# Patient Record
Sex: FEMALE | Race: WHITE | Employment: PART TIME | URBAN - METROPOLITAN AREA
[De-identification: names, ages, dates, MRNs, and addresses within clinical notes are randomized per-mention and may not be internally consistent; named-entity substitution may affect disease eponyms.]

---
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
## 2020-10-21 ENCOUNTER — OFFICE VISIT (OUTPATIENT)
Dept: FAMILY MEDICINE CLINIC | Age: 20
End: 2020-10-21
Payer: COMMERCIAL

## 2020-10-21 VITALS
OXYGEN SATURATION: 98 % | WEIGHT: 207 LBS | HEART RATE: 81 BPM | DIASTOLIC BLOOD PRESSURE: 83 MMHG | SYSTOLIC BLOOD PRESSURE: 120 MMHG | HEIGHT: 67 IN | BODY MASS INDEX: 32.49 KG/M2 | TEMPERATURE: 97.8 F

## 2020-10-21 PROCEDURE — G0444 DEPRESSION SCREEN ANNUAL: HCPCS | Performed by: FAMILY MEDICINE

## 2020-10-21 PROCEDURE — 99213 OFFICE O/P EST LOW 20 MIN: CPT | Performed by: FAMILY MEDICINE

## 2020-10-21 RX ORDER — LORATADINE, PSEUDOEPHEDRINE SULFATE 5; 120 MG/1; MG/1
TABLET, FILM COATED, EXTENDED RELEASE ORAL
COMMUNITY
Start: 2020-10-10

## 2020-10-21 RX ORDER — OMEPRAZOLE 40 MG/1
CAPSULE, DELAYED RELEASE ORAL
COMMUNITY
Start: 2020-10-12

## 2020-10-21 RX ORDER — GUAIFENESIN, DEXTROMETHORPHAN HBR 60; 1200 MG/1; MG/1
TABLET ORAL
COMMUNITY
Start: 2020-10-10

## 2020-10-21 RX ORDER — HYDROXYZINE HYDROCHLORIDE 25 MG/1
25 TABLET, FILM COATED ORAL EVERY 8 HOURS PRN
Qty: 30 TABLET | Refills: 0 | Status: SHIPPED | OUTPATIENT
Start: 2020-10-21 | End: 2020-10-31

## 2020-10-21 RX ORDER — DICYCLOMINE HYDROCHLORIDE 10 MG/1
10 CAPSULE ORAL 4 TIMES DAILY
Qty: 30 CAPSULE | Refills: 3 | Status: SHIPPED | OUTPATIENT
Start: 2020-10-21 | End: 2020-11-20

## 2020-10-21 SDOH — HEALTH STABILITY: MENTAL HEALTH: HOW OFTEN DO YOU HAVE A DRINK CONTAINING ALCOHOL?: NEVER

## 2020-10-21 ASSESSMENT — PATIENT HEALTH QUESTIONNAIRE - PHQ9
1. LITTLE INTEREST OR PLEASURE IN DOING THINGS: 2
9. THOUGHTS THAT YOU WOULD BE BETTER OFF DEAD, OR OF HURTING YOURSELF: 0
3. TROUBLE FALLING OR STAYING ASLEEP: 2
SUM OF ALL RESPONSES TO PHQ9 QUESTIONS 1 & 2: 4
SUM OF ALL RESPONSES TO PHQ QUESTIONS 1-9: 11
SUM OF ALL RESPONSES TO PHQ QUESTIONS 1-9: 11
8. MOVING OR SPEAKING SO SLOWLY THAT OTHER PEOPLE COULD HAVE NOTICED. OR THE OPPOSITE, BEING SO FIGETY OR RESTLESS THAT YOU HAVE BEEN MOVING AROUND A LOT MORE THAN USUAL: 0
10. IF YOU CHECKED OFF ANY PROBLEMS, HOW DIFFICULT HAVE THESE PROBLEMS MADE IT FOR YOU TO DO YOUR WORK, TAKE CARE OF THINGS AT HOME, OR GET ALONG WITH OTHER PEOPLE: 2
2. FEELING DOWN, DEPRESSED OR HOPELESS: 2
5. POOR APPETITE OR OVEREATING: 1
SUM OF ALL RESPONSES TO PHQ QUESTIONS 1-9: 11
4. FEELING TIRED OR HAVING LITTLE ENERGY: 3
6. FEELING BAD ABOUT YOURSELF - OR THAT YOU ARE A FAILURE OR HAVE LET YOURSELF OR YOUR FAMILY DOWN: 0
7. TROUBLE CONCENTRATING ON THINGS, SUCH AS READING THE NEWSPAPER OR WATCHING TELEVISION: 1

## 2020-10-21 ASSESSMENT — COLUMBIA-SUICIDE SEVERITY RATING SCALE - C-SSRS
2. HAVE YOU ACTUALLY HAD ANY THOUGHTS OF KILLING YOURSELF?: NO
6. HAVE YOU EVER DONE ANYTHING, STARTED TO DO ANYTHING, OR PREPARED TO DO ANYTHING TO END YOUR LIFE?: NO
1. WITHIN THE PAST MONTH, HAVE YOU WISHED YOU WERE DEAD OR WISHED YOU COULD GO TO SLEEP AND NOT WAKE UP?: NO

## 2021-07-21 ENCOUNTER — TELEPHONE (OUTPATIENT)
Dept: PRIMARY CARE CLINIC | Age: 21
End: 2021-07-21

## 2021-08-01 PROCEDURE — 96374 THER/PROPH/DIAG INJ IV PUSH: CPT

## 2021-08-01 PROCEDURE — 99284 EMERGENCY DEPT VISIT MOD MDM: CPT

## 2021-08-01 PROCEDURE — 96361 HYDRATE IV INFUSION ADD-ON: CPT

## 2021-08-01 PROCEDURE — 96375 TX/PRO/DX INJ NEW DRUG ADDON: CPT

## 2021-08-01 ASSESSMENT — PAIN DESCRIPTION - LOCATION: LOCATION: ABDOMEN

## 2021-08-01 ASSESSMENT — PAIN SCALES - GENERAL: PAINLEVEL_OUTOF10: 5

## 2021-08-02 ENCOUNTER — HOSPITAL ENCOUNTER (EMERGENCY)
Age: 21
Discharge: HOME | End: 2021-08-02
Attending: EMERGENCY MEDICINE
Payer: COMMERCIAL

## 2021-08-02 VITALS
TEMPERATURE: 97.4 F | DIASTOLIC BLOOD PRESSURE: 79 MMHG | OXYGEN SATURATION: 100 % | BODY MASS INDEX: 26.52 KG/M2 | HEIGHT: 68 IN | SYSTOLIC BLOOD PRESSURE: 108 MMHG | HEART RATE: 58 BPM | WEIGHT: 175 LBS | RESPIRATION RATE: 16 BRPM

## 2021-08-02 DIAGNOSIS — R11.0 NAUSEA: Primary | ICD-10-CM

## 2021-08-02 DIAGNOSIS — R10.12 LEFT UPPER QUADRANT ABDOMINAL PAIN: ICD-10-CM

## 2021-08-02 LAB
ABSOLUTE EOS #: 0.14 K/UL (ref 0–0.44)
ABSOLUTE IMMATURE GRANULOCYTE: <0.03 K/UL (ref 0–0.3)
ABSOLUTE LYMPH #: 3.92 K/UL (ref 1.1–3.7)
ABSOLUTE MONO #: 0.64 K/UL (ref 0.1–1.4)
ALBUMIN SERPL-MCNC: 3.8 G/DL (ref 3.5–5.2)
ALBUMIN/GLOBULIN RATIO: 1.3 (ref 1–2.5)
ALP BLD-CCNC: 80 U/L (ref 35–104)
ALT SERPL-CCNC: 39 U/L (ref 5–33)
ANION GAP SERPL CALCULATED.3IONS-SCNC: 11 MMOL/L (ref 9–17)
AST SERPL-CCNC: 40 U/L
BASOPHILS # BLD: 0 % (ref 0–2)
BASOPHILS ABSOLUTE: <0.03 K/UL (ref 0–0.2)
BILIRUB SERPL-MCNC: 0.22 MG/DL (ref 0.3–1.2)
BILIRUBIN URINE: NEGATIVE
BUN BLDV-MCNC: 11 MG/DL (ref 6–20)
BUN/CREAT BLD: ABNORMAL (ref 9–20)
CALCIUM SERPL-MCNC: 8.9 MG/DL (ref 8.6–10.4)
CHLORIDE BLD-SCNC: 105 MMOL/L (ref 98–107)
CO2: 23 MMOL/L (ref 20–31)
COLOR: YELLOW
COMMENT UA: NORMAL
CREAT SERPL-MCNC: 0.74 MG/DL (ref 0.5–0.9)
DIFFERENTIAL TYPE: ABNORMAL
EOSINOPHILS RELATIVE PERCENT: 1 % (ref 1–4)
GFR AFRICAN AMERICAN: >60 ML/MIN
GFR NON-AFRICAN AMERICAN: >60 ML/MIN
GFR SERPL CREATININE-BSD FRML MDRD: ABNORMAL ML/MIN/{1.73_M2}
GFR SERPL CREATININE-BSD FRML MDRD: ABNORMAL ML/MIN/{1.73_M2}
GLUCOSE BLD-MCNC: 94 MG/DL (ref 70–99)
GLUCOSE URINE: NEGATIVE
HCG(URINE) PREGNANCY TEST: NEGATIVE
HCT VFR BLD CALC: 41.5 % (ref 36.3–47.1)
HEMOGLOBIN: 13.4 G/DL (ref 11.9–15.1)
IMMATURE GRANULOCYTES: 0 %
KETONES, URINE: NEGATIVE
LEUKOCYTE ESTERASE, URINE: NEGATIVE
LIPASE: 44 U/L (ref 13–60)
LYMPHOCYTES # BLD: 39 % (ref 25–45)
MCH RBC QN AUTO: 29.5 PG (ref 25.2–33.5)
MCHC RBC AUTO-ENTMCNC: 32.3 G/DL (ref 28.4–34.8)
MCV RBC AUTO: 91.4 FL (ref 82.6–102.9)
MONOCYTES # BLD: 6 % (ref 2–8)
NITRITE, URINE: NEGATIVE
NRBC AUTOMATED: 0 PER 100 WBC
PDW BLD-RTO: 12.4 % (ref 11.8–14.4)
PH UA: 6 (ref 5–8)
PLATELET # BLD: 283 K/UL (ref 138–453)
PLATELET ESTIMATE: ABNORMAL
PMV BLD AUTO: 10.6 FL (ref 8.1–13.5)
POTASSIUM SERPL-SCNC: 3.8 MMOL/L (ref 3.7–5.3)
PROTEIN UA: NEGATIVE
RBC # BLD: 4.54 M/UL (ref 3.95–5.11)
RBC # BLD: ABNORMAL 10*6/UL
SEG NEUTROPHILS: 54 % (ref 34–64)
SEGMENTED NEUTROPHILS ABSOLUTE COUNT: 5.24 K/UL (ref 1.5–8.1)
SODIUM BLD-SCNC: 139 MMOL/L (ref 135–144)
SPECIFIC GRAVITY UA: 1.02 (ref 1–1.03)
TOTAL PROTEIN: 6.8 G/DL (ref 6.4–8.3)
TURBIDITY: CLEAR
URINE HGB: NEGATIVE
UROBILINOGEN, URINE: NORMAL
WBC # BLD: 10 K/UL (ref 4.5–13.5)
WBC # BLD: ABNORMAL 10*3/UL

## 2021-08-02 PROCEDURE — 96375 TX/PRO/DX INJ NEW DRUG ADDON: CPT

## 2021-08-02 PROCEDURE — 96361 HYDRATE IV INFUSION ADD-ON: CPT

## 2021-08-02 PROCEDURE — 2580000003 HC RX 258: Performed by: HEALTH CARE PROVIDER

## 2021-08-02 PROCEDURE — 96374 THER/PROPH/DIAG INJ IV PUSH: CPT

## 2021-08-02 PROCEDURE — 2500000003 HC RX 250 WO HCPCS: Performed by: HEALTH CARE PROVIDER

## 2021-08-02 PROCEDURE — 81003 URINALYSIS AUTO W/O SCOPE: CPT

## 2021-08-02 PROCEDURE — 6360000002 HC RX W HCPCS: Performed by: HEALTH CARE PROVIDER

## 2021-08-02 PROCEDURE — 85025 COMPLETE CBC W/AUTO DIFF WBC: CPT

## 2021-08-02 PROCEDURE — 83690 ASSAY OF LIPASE: CPT

## 2021-08-02 PROCEDURE — 80053 COMPREHEN METABOLIC PANEL: CPT

## 2021-08-02 PROCEDURE — 81025 URINE PREGNANCY TEST: CPT

## 2021-08-02 RX ORDER — ONDANSETRON 2 MG/ML
4 INJECTION INTRAMUSCULAR; INTRAVENOUS ONCE
Status: COMPLETED | OUTPATIENT
Start: 2021-08-02 | End: 2021-08-02

## 2021-08-02 RX ORDER — 0.9 % SODIUM CHLORIDE 0.9 %
1000 INTRAVENOUS SOLUTION INTRAVENOUS ONCE
Status: COMPLETED | OUTPATIENT
Start: 2021-08-02 | End: 2021-08-02

## 2021-08-02 RX ADMIN — SODIUM CHLORIDE 1000 ML: 9 INJECTION, SOLUTION INTRAVENOUS at 02:21

## 2021-08-02 RX ADMIN — ONDANSETRON 4 MG: 2 INJECTION INTRAMUSCULAR; INTRAVENOUS at 02:21

## 2021-08-02 RX ADMIN — FAMOTIDINE 20 MG: 10 INJECTION, SOLUTION INTRAVENOUS at 02:21

## 2021-08-02 ASSESSMENT — ENCOUNTER SYMPTOMS
CONSTIPATION: 0
COLOR CHANGE: 0
ABDOMINAL PAIN: 1
DIARRHEA: 0
SINUS PAIN: 0
SHORTNESS OF BREATH: 0
BLOOD IN STOOL: 0
VOMITING: 0
BACK PAIN: 0
CHEST TIGHTNESS: 0
NAUSEA: 1

## 2021-08-02 NOTE — ED NOTES
[facility]  ED  Emergency Department Encounter  EmergencyMedicine Resident     Pt Name:Aileen Hairston  MRN: [id number]  Birthdate 2000  Date of evaluation: 8/2/21  PCP:  Duncan Gayle MD    This patient was evaluated in the Emergency Department for symptoms described in the history of present illness. The patient was evaluated in the context of the global COVID-19 pandemic, which necessitated consideration that the patient might be at risk for infection with the SARS-CoV-2 virus that causes COVID-19. Institutional protocols and algorithms that pertain to the evaluation of patients at risk for COVID-19 are in a state of rapid change based on information released by regulatory bodies including the CDC and federal and state organizations. These policies and algorithms were followed during the patient's care in the ED. CHIEF COMPLAINT       Chief Complaint   Patient presents with    Abdominal Pain       HISTORY OF PRESENT ILLNESS  (Location/Symptom, Timing/Onset, Context/Setting, Quality, Duration, Modifying Factors, Severity.)      Liana Ochoa is a 23 y.o. female who presents with nausea. Patient states she had about a 2-year history of intermittent nausea and occasional stomach cramping. States she has seen her primary care doctor several times for the issue, but has not seen GI as of yet. Denies any real acute worsening of symptoms, but presents mainly in order to get a second opinion. Patient denies any acute distress. States she currently has some mild nausea however denies any other symptoms. Denies any fever, chills, dysuria, flank pain, hematuria, abdominal pain, blood/mucous in stools. Has been prescribed Prilosec in the past without any relief. Uses Zofran for the intermittent nausea with some success. Denies any recent alcohol, drug, marijuana use.     PAST MEDICAL / SURGICAL / SOCIAL / FAMILY HISTORY      has a past medical history of Drug abuse, daily use ([facility].). has no past surgical history on file. Social History     Socioeconomic History    Marital status: Single     Spouse name: Not on file    Number of children: Not on file    Years of education: Not on file    Highest education level: Not on file   Occupational History    Not on file   Tobacco Use    Smoking status: Never Smoker    Smokeless tobacco: Never Used   Substance and Sexual Activity    Alcohol use: Never    Drug use: Not Currently     Frequency: 3.0 times per week     Types: IV, Cocaine, Other-see comments     Comment: daily herion about 40$ day    Sexual activity: Not on file   Other Topics Concern    Not on file   Social History Narrative    Not on file     Social Determinants of Health     Financial Resource Strain:     Difficulty of Paying Living Expenses:    Food Insecurity:     Worried About Running Out of Food in the Last Year:     [address] in the Last Year:    Transportation Needs:     Lack of Transportation (Medical):  Lack of Transportation (Non-Medical):    Physical Activity:     Days of Exercise per Week:     Minutes of Exercise per Session:    Stress:     Feeling of Stress :    Social Connections:     Frequency of Communication with Friends and Family:     Frequency of Social Gatherings with Friends and Family:     Attends [employer] Services:     Active Member of Clubs or Organizations:     Attends Club or Organization Meetings:     Marital Status:    Intimate Partner Violence:     Fear of Current or Ex-Partner:     Emotionally Abused:     Physically Abused:     Sexually Abused:        No family history on file. Allergies:  Patient has no known allergies. Home Medications:  Prior to Admission medications    Medication Sig Start Date End Date Taking?  Authorizing Provider   Dextromethorphan-guaiFENesin (MUCUS RELIEF DM MAX)  MG TB12  10/10/20   Historical Provider, MD   LORATADINE-D 12HR 5-120 MG per extended release tablet 10/10/20   Historical Provider, MD   omeprazole (PRILOSEC) 40 MG delayed release capsule  10/12/20   Historical Provider, MD   dicyclomine (BENTYL) 10 MG capsule Take 1 capsule by mouth 4 times daily 10/21/20 11/20/20  Bill Couch MD       REVIEW OF SYSTEMS    (2-9 systems for level 4, 10 or more for level 5)      Review of Systems   Constitutional: Negative for chills and fever. HENT: Negative for congestion, ear pain and sinus pain. Respiratory: Negative for chest tightness and shortness of breath. Cardiovascular: Negative for chest pain. Gastrointestinal: Positive for abdominal pain and nausea. Negative for blood in stool, constipation, diarrhea and vomiting. Genitourinary: Negative for dysuria, flank pain and hematuria. Musculoskeletal: Negative for back pain. Skin: Negative for color change. Neurological: Negative for syncope. PHYSICAL EXAM   (up to 7 for level 4, 8 or more for level 5)      INITIAL VITALS:   /79   Pulse 58   Temp 97.4 °F (36.3 °C) (Oral)   Resp 16   Ht 5' 8\" (1.727 m)   Wt 175 lb (79.4 kg)   SpO2 100%   BMI 26.61 kg/m²     Physical Exam  Constitutional:       General: She is not in acute distress. Appearance: She is not ill-appearing or toxic-appearing. HENT:      Head: Normocephalic and atraumatic. Cardiovascular:      Rate and Rhythm: Tachycardia present. Heart sounds: Normal heart sounds. Pulmonary:      Effort: Pulmonary effort is normal. No respiratory distress. Abdominal:      General: Abdomen is flat. There is no distension. There are no signs of injury. Palpations: Abdomen is soft. There is no mass. Tenderness: There is abdominal tenderness in the right upper quadrant and left upper quadrant. Comments: Mild discomfort on deep palpation of right upper quadrant left upper quadrant. Skin:     General: Skin is warm and dry. Neurological:      General: No focal deficit present.       Mental Status: She is alert.   Psychiatric:         Mood and Affect: Mood normal.         Behavior: Behavior normal.         DIFFERENTIAL  DIAGNOSIS     PLAN (LABS / Roque Ingles / EKG):  Orders Placed This Encounter   Procedures    Comprehensive Metabolic Panel w/ Reflex to MG    Urinalysis Reflex to Culture    CBC Auto Differential    Pregnancy, Urine    Lipase    Insert peripheral IV       MEDICATIONS ORDERED:  Orders Placed This Encounter   Medications    0.9 % sodium chloride bolus    ondansetron (ZOFRAN) injection 4 mg    famotidine (PEPCID) injection 20 mg       DDX: Nausea vs gastroenteritis vs dehydration vs less likely hepatitis vs less likely pancreatitis vs less likely colitis    DIAGNOSTIC RESULTS / EMERGENCY DEPARTMENT COURSE / MDM   LAB RESULTS:  Results for orders placed or performed during the hospital encounter of 08/02/21   Comprehensive Metabolic Panel w/ Reflex to MG   Result Value Ref Range    Glucose 94 70 - 99 mg/dL    BUN 11 6 - 20 mg/dL    CREATININE 0.74 0.50 - 0.90 mg/dL    Bun/Cre Ratio NOT REPORTED 9 - 20    Calcium 8.9 8.6 - 10.4 mg/dL    Sodium 139 135 - 144 mmol/L    Potassium 3.8 3.7 - 5.3 mmol/L    Chloride 105 98 - 107 mmol/L    CO2 23 20 - 31 mmol/L    Anion Gap 11 9 - 17 mmol/L    Alkaline Phosphatase 80 35 - 104 U/L    ALT 39 (H) 5 - 33 U/L    AST 40 (H) <32 U/L    Total Bilirubin 0.22 (L) 0.3 - 1.2 mg/dL    Total Protein 6.8 6.4 - 8.3 g/dL    Albumin 3.8 3.5 - 5.2 g/dL    Albumin/Globulin Ratio 1.3 1.0 - 2.5    GFR Non-African American >60 >60 mL/min    GFR African American >60 >60 mL/min    GFR Comment          GFR Staging NOT REPORTED    Urinalysis Reflex to Culture    Specimen: Urine voided   Result Value Ref Range    Color, UA YELLOW YELLOW    Turbidity UA CLEAR CLEAR    Glucose, Ur NEGATIVE NEGATIVE    Bilirubin Urine NEGATIVE NEGATIVE    Ketones, Urine NEGATIVE NEGATIVE    Specific Gravity, UA 1.021 1.005 - 1.030    Urine Hgb NEGATIVE NEGATIVE    pH, UA 6.0 5.0 - 8.0    Protein, UA NEGATIVE NEGATIVE    Urobilinogen, Urine Normal Normal    Nitrite, Urine NEGATIVE NEGATIVE    Leukocyte Esterase, Urine NEGATIVE NEGATIVE    Urinalysis Comments       Microscopic exam not performed based on chemical results unless requested in original order. CBC Auto Differential   Result Value Ref Range    WBC 10.0 4.5 - 13.5 k/uL    RBC 4.54 3.95 - 5.11 m/uL    Hemoglobin 13.4 11.9 - 15.1 g/dL    Hematocrit 41.5 36.3 - 47.1 %    MCV 91.4 82.6 - 102.9 fL    MCH 29.5 25.2 - 33.5 pg    MCHC 32.3 28.4 - 34.8 g/dL    RDW 12.4 11.8 - 14.4 %    Platelets 026 545 - 748 k/uL    MPV 10.6 8.1 - 13.5 fL    NRBC Automated 0.0 0.0 per 100 WBC    Differential Type NOT REPORTED     Seg Neutrophils 54 34 - 64 %    Lymphocytes 39 25 - 45 %    Monocytes 6 2 - 8 %    Eosinophils % 1 1 - 4 %    Basophils 0 0 - 2 %    Immature Granulocytes 0 0 %    Segs Absolute 5.24 1.50 - 8.10 k/uL    Absolute Lymph # 3.92 (H) 1.10 - 3.70 k/uL    Absolute Mono # 0.64 0.10 - 1.40 k/uL    Absolute Eos # 0.14 0.00 - 0.44 k/uL    Basophils Absolute <0.03 0.00 - 0.20 k/uL    Absolute Immature Granulocyte <0.03 0.00 - 0.30 k/uL    WBC Morphology NOT REPORTED     RBC Morphology NOT REPORTED     Platelet Estimate NOT REPORTED    Pregnancy, Urine   Result Value Ref Range    HCG(Urine) Pregnancy Test NEGATIVE NEGATIVE   Lipase   Result Value Ref Range    Lipase 44 13 - 60 U/L       IMPRESSION: 23year old female with chronic nausea and no acute laboratory abnormalities at this time. All EKG's are interpreted by the Emergency Department Physician who either signs or Co-signs this chart in the absence of a cardiologist.    EMERGENCY DEPARTMENT COURSE:  ED Course as of Aug 02 0342   Mon Aug 02, 2021   0215 Will order CBC, CMP, UA, pregnancy. Tachycardia noted. Will order 1L NS while waiting for labs. [JS]   0310 Labs reviewed. Overall unremarkable.  Will get updated set of vitals and plan for d/c.    [JS]      ED Course User Index  [JS] Capo Aguilera          FINAL IMPRESSION      1. Nausea    2.  Left upper quadrant abdominal pain          DISPOSITION / PLAN     DISPOSITION   Discharge      PATIENT REFERRED TO:  Hebert Chambers, [address] [facility] [address]  [phone]    Schedule an appointment as soon as possible for a visit in 3 days      [facility] ED  [address] [address]  [phone]  Go to   If symptoms worsen      DISCHARGE MEDICATIONS:  Discharge Medication List as of 8/2/2021  3:23 AM          Adarsh Leigh DO  Emergency Medicine Resident    (Please note that portions of thisnote were completed with a voice recognition program.  Efforts were made to edit the dictations but occasionally words are mis-transcribed.)     Marnie Henderson DO  Resident  08/02/21 [id number]

## 2021-08-02 NOTE — ED PROVIDER NOTES
Called patient, message left to call office for results.   Lawyer Dang     Emergency Department     Faculty Attestation    I performed a history and physical examination of the patient and discussed management with the resident. I have reviewed and agree with the residents findings including all diagnostic interpretations, and treatment plans as written. Any areas of disagreement are noted on the chart. I was personally present for the key portions of any procedures. I have documented in the chart those procedures where I was not present during the key portions. I have reviewed the emergency nurses triage note. I agree with the chief complaint, past medical history, past surgical history, allergies, medications, social and family history as documented unless otherwise noted below. Documentation of the HPI, Physical Exam and Medical Decision Making performed by [email or web address] is based on my personal performance of the HPI, PE and MDM. For Physician Assistant/ Nurse Practitioner cases/documentation I have personally evaluated this patient and have completed at least one if not all key elements of the E/M (history, physical exam, and MDM). Additional findings are as noted. 24 yo F c/o upper med abdominal pain, nausea without vomiting, no injury, no fever,   pe Edith RN escort for exam: mild upper abdominal tenderness, no distension, no rigidity, no guarding, no rebound, benign exam at this time    Lipase 44, wbc 10, cr 0.7, ua stable, ucg-, I feel pt stable for out pt tx,     EKG Interpretation    Interpreted by me      CRITICAL CARE: There was a high probability of clinically significant/life threatening deterioration in this patient's condition which required my urgent intervention. Total critical care time was 0 minutes. This excludes any time for separately reportable procedures.        Himanshu 23, DO  08/02/21 Alice 38, DO  08/02/21 0939

## 2021-08-05 ENCOUNTER — HOSPITAL ENCOUNTER (EMERGENCY)
Age: 21
Discharge: HOME | End: 2021-08-05
Attending: EMERGENCY MEDICINE
Payer: COMMERCIAL

## 2021-08-05 VITALS
SYSTOLIC BLOOD PRESSURE: 156 MMHG | RESPIRATION RATE: 18 BRPM | DIASTOLIC BLOOD PRESSURE: 96 MMHG | HEIGHT: 68 IN | TEMPERATURE: 98.4 F | OXYGEN SATURATION: 95 % | HEART RATE: 103 BPM | BODY MASS INDEX: 26.52 KG/M2 | WEIGHT: 175 LBS

## 2021-08-05 DIAGNOSIS — R11.2 NON-INTRACTABLE VOMITING WITH NAUSEA, UNSPECIFIED VOMITING TYPE: Primary | ICD-10-CM

## 2021-08-05 LAB
ABSOLUTE EOS #: <0.03 K/UL (ref 0–0.44)
ABSOLUTE IMMATURE GRANULOCYTE: 0.12 K/UL (ref 0–0.3)
ABSOLUTE LYMPH #: 1.39 K/UL (ref 1.1–3.7)
ABSOLUTE MONO #: 0.68 K/UL (ref 0.1–1.4)
ACETAMINOPHEN LEVEL: <5 UG/ML (ref 10–30)
ANION GAP SERPL CALCULATED.3IONS-SCNC: 11 MMOL/L (ref 9–17)
BASOPHILS # BLD: 0 % (ref 0–2)
BASOPHILS ABSOLUTE: <0.03 K/UL (ref 0–0.2)
BUN BLDV-MCNC: 9 MG/DL (ref 6–20)
BUN/CREAT BLD: ABNORMAL (ref 9–20)
CALCIUM SERPL-MCNC: 9.1 MG/DL (ref 8.6–10.4)
CHLORIDE BLD-SCNC: 106 MMOL/L (ref 98–107)
CO2: 21 MMOL/L (ref 20–31)
CREAT SERPL-MCNC: 0.66 MG/DL (ref 0.5–0.9)
DIFFERENTIAL TYPE: ABNORMAL
EOSINOPHILS RELATIVE PERCENT: 0 % (ref 1–4)
ETHANOL PERCENT: <0.01 %
ETHANOL: <10 MG/DL
GFR AFRICAN AMERICAN: >60 ML/MIN
GFR NON-AFRICAN AMERICAN: >60 ML/MIN
GFR SERPL CREATININE-BSD FRML MDRD: ABNORMAL ML/MIN/{1.73_M2}
GFR SERPL CREATININE-BSD FRML MDRD: ABNORMAL ML/MIN/{1.73_M2}
GLUCOSE BLD-MCNC: 167 MG/DL (ref 70–99)
HCT VFR BLD CALC: 44.6 % (ref 36.3–47.1)
HEMOGLOBIN: 14.2 G/DL (ref 11.9–15.1)
IMMATURE GRANULOCYTES: 1 %
LIPASE: 23 U/L (ref 13–60)
LYMPHOCYTES # BLD: 7 % (ref 25–45)
MCH RBC QN AUTO: 29.3 PG (ref 25.2–33.5)
MCHC RBC AUTO-ENTMCNC: 31.8 G/DL (ref 28.4–34.8)
MCV RBC AUTO: 92.1 FL (ref 82.6–102.9)
MONOCYTES # BLD: 4 % (ref 2–8)
NRBC AUTOMATED: 0 PER 100 WBC
PDW BLD-RTO: 12.2 % (ref 11.8–14.4)
PLATELET # BLD: 361 K/UL (ref 138–453)
PLATELET ESTIMATE: ABNORMAL
PMV BLD AUTO: 10.3 FL (ref 8.1–13.5)
POTASSIUM SERPL-SCNC: 4.2 MMOL/L (ref 3.7–5.3)
RBC # BLD: 4.84 M/UL (ref 3.95–5.11)
RBC # BLD: ABNORMAL 10*6/UL
SALICYLATE LEVEL: <1 MG/DL (ref 3–10)
SEG NEUTROPHILS: 88 % (ref 34–64)
SEGMENTED NEUTROPHILS ABSOLUTE COUNT: 16.86 K/UL (ref 1.5–8.1)
SODIUM BLD-SCNC: 138 MMOL/L (ref 135–144)
TOXIC TRICYCLIC SC,BLOOD: NEGATIVE
WBC # BLD: 19.1 K/UL (ref 4.5–13.5)
WBC # BLD: ABNORMAL 10*3/UL

## 2021-08-05 PROCEDURE — 6360000002 HC RX W HCPCS: Performed by: EMERGENCY MEDICINE

## 2021-08-05 PROCEDURE — 99283 EMERGENCY DEPT VISIT LOW MDM: CPT

## 2021-08-05 PROCEDURE — 80307 DRUG TEST PRSMV CHEM ANLYZR: CPT

## 2021-08-05 PROCEDURE — 85025 COMPLETE CBC W/AUTO DIFF WBC: CPT

## 2021-08-05 PROCEDURE — 93005 ELECTROCARDIOGRAM TRACING: CPT | Performed by: GENERAL PRACTICE

## 2021-08-05 PROCEDURE — 96374 THER/PROPH/DIAG INJ IV PUSH: CPT

## 2021-08-05 PROCEDURE — 2580000003 HC RX 258: Performed by: GENERAL PRACTICE

## 2021-08-05 PROCEDURE — 6360000002 HC RX W HCPCS: Performed by: STUDENT IN AN ORGANIZED HEALTH CARE EDUCATION/TRAINING PROGRAM

## 2021-08-05 PROCEDURE — 96376 TX/PRO/DX INJ SAME DRUG ADON: CPT

## 2021-08-05 PROCEDURE — G0480 DRUG TEST DEF 1-7 CLASSES: HCPCS

## 2021-08-05 PROCEDURE — 80143 DRUG ASSAY ACETAMINOPHEN: CPT

## 2021-08-05 PROCEDURE — 81001 URINALYSIS AUTO W/SCOPE: CPT

## 2021-08-05 PROCEDURE — 80048 BASIC METABOLIC PNL TOTAL CA: CPT

## 2021-08-05 PROCEDURE — 83690 ASSAY OF LIPASE: CPT

## 2021-08-05 PROCEDURE — 80179 DRUG ASSAY SALICYLATE: CPT

## 2021-08-05 PROCEDURE — 96361 HYDRATE IV INFUSION ADD-ON: CPT

## 2021-08-05 PROCEDURE — 81025 URINE PREGNANCY TEST: CPT

## 2021-08-05 RX ORDER — ONDANSETRON 2 MG/ML
4 INJECTION INTRAMUSCULAR; INTRAVENOUS ONCE
Status: COMPLETED | OUTPATIENT
Start: 2021-08-05 | End: 2021-08-05

## 2021-08-05 RX ORDER — ONDANSETRON 4 MG/1
4 TABLET, ORALLY DISINTEGRATING ORAL EVERY 8 HOURS PRN
Qty: 20 TABLET | Refills: 0 | Status: SHIPPED | OUTPATIENT
Start: 2021-08-05

## 2021-08-05 RX ORDER — 0.9 % SODIUM CHLORIDE 0.9 %
1000 INTRAVENOUS SOLUTION INTRAVENOUS ONCE
Status: COMPLETED | OUTPATIENT
Start: 2021-08-05 | End: 2021-08-05

## 2021-08-05 RX ADMIN — ONDANSETRON 4 MG: 2 INJECTION, SOLUTION INTRAMUSCULAR; INTRAVENOUS at 21:43

## 2021-08-05 RX ADMIN — ONDANSETRON 4 MG: 2 INJECTION INTRAMUSCULAR; INTRAVENOUS at 17:18

## 2021-08-05 RX ADMIN — SODIUM CHLORIDE 1000 ML: 9 INJECTION, SOLUTION INTRAVENOUS at 17:17

## 2021-08-05 ASSESSMENT — ENCOUNTER SYMPTOMS
VOMITING: 1
ABDOMINAL PAIN: 0
COUGH: 0
SHORTNESS OF BREATH: 0
NAUSEA: 1
DIARRHEA: 0
BACK PAIN: 0

## 2021-08-05 NOTE — ED PROVIDER NOTES
[facility]  FACULTY HANDOFF       Handoff taken on the following patient from prior Attending Physician: Dr. Tami Laughlin  Pt Name: Javan Condon  PCP:  Quinton Ohara MD    Attestation  I was available and discussed any additional care issues that arose and coordinated the management plans with the resident(s) caring for the patient during my duty period. Any areas of disagreement with resident's documentation of care or procedures are noted on the chart. I was personally present for the key portions of any/all procedures during my duty period. I have documented in the chart those procedures where I was not present during the key portions. CHIEF COMPLAINT       Chief Complaint   Patient presents with    Emesis     Pt brought inby medics from work, pt c/o vomiting, seen here two days ago for the same, not getting better, continously falls asleep, medics report the same         CURRENT MEDICATIONS     Previous Medications  Previous Medications    DEXTROMETHORPHAN-GUAIFENESIN (MUCUS RELIEF DM MAX)  MG TB12        DICYCLOMINE (BENTYL) 10 MG CAPSULE    Take 1 capsule by mouth 4 times daily    LORATADINE-D 12HR 5-120 MG PER EXTENDED RELEASE TABLET        OMEPRAZOLE (PRILOSEC) 40 MG DELAYED RELEASE CAPSULE           Encounter Medications  Orders Placed This Encounter   Medications    0.9 % sodium chloride bolus    ondansetron (ZOFRAN) injection 4 mg       ALLERGIES     is allergic to vicodin [hydrocodone-acetaminophen].       RECENT VITALS:   Temp: 98.4 °F (36.9 °C),  Pulse: 103, Resp: 18, BP: (!) 156/96    RADIOLOGY:   No orders to display       LABS:  Labs Reviewed   CBC WITH AUTO DIFFERENTIAL - Abnormal; Notable for the following components:       Result Value    WBC 19.1 (*)     Seg Neutrophils 88 (*)     Lymphocytes 7 (*)     Eosinophils % 0 (*)     Immature Granulocytes 1 (*)     Segs Absolute 16.86 (*)     All other components within normal limits   BASIC METABOLIC PANEL - Abnormal; Notable for the following components:    Glucose 167 (*)     All other components within normal limits   TOX SCR, BLD, ED - Abnormal; Notable for the following components:    Acetaminophen Level <5 (*)     Salicylate Lvl <1 (*)     All other components within normal limits   LIPASE   URINALYSIS WITH MICROSCOPIC   URINE DRUG SCREEN   PREGNANCY, URINE           PLAN/ TASKS OUTSTANDING     Patient has nausea and vomiting. Patient is also somewhat somnolent. Patient had similar symptoms several days ago but has them once again today. Plan for supportive care, labs, if negative work-up and patient is improved plan for discharge with follow-up to PCP.       (Please note that portions of this note were completed with a voice recognition program.  Efforts were made to edit the dictations but occasionally words are mis-transcribed.)    Garth Hays MD, MD,   Attending Emergency Physician       Garth Hays MD  08/05/21 [id number]

## 2021-08-05 NOTE — ED NOTES
Pt ambulated to bathroom, gait steady, unable to obtain urine sample     Pollo Mcleod RN  08/05/21 1944

## 2021-08-05 NOTE — ED PROVIDER NOTES
[facility] [address]  ED  Emergency Department Encounter  EmergencyMedicine Resident     Pt Name:Aileen Sylvester  MRN: [id number]  Birthdate 2000  Date of evaluation: 8/5/21  PCP:  Tima Tellez MD    CHIEF COMPLAINT       Chief Complaint   Patient presents with    Emesis     Pt brought inby medics from work, pt c/o vomiting, seen here two days ago for the same, not getting better, continously falls asleep, medics report the same       HISTORY OF PRESENT ILLNESS  (Location/Symptom, Timing/Onset, Context/Setting, Quality, Duration, Modifying Factors, Severity.)      Sera Padilla is a 23 y.o. female who presents with nausea and vomiting, patient states she has had this intermittent nausea and vomiting for the last 4 years. Patient was seen 2 days ago for similar symptoms. Denies any abdominal pain, diarrhea fever or chills. Denies any dysuria, no hematemesis last menstrual period was 7/8/2021, proxy test was taken 2 days ago which was negative. Denies any alcohol or drug use. EMS was called the patient's work as she was continually falling asleep. Patient states she has vomited 6 times today. Patient is a she is unable to tolerate oral intake. PAST MEDICAL / SURGICAL / SOCIAL / FAMILY HISTORY      has a past medical history of Drug abuse, daily use ([address] has no past surgical history on file.     Social History     Socioeconomic History    Marital status: Single     Spouse name: Not on file    Number of children: Not on file    Years of education: Not on file    Highest education level: Not on file   Occupational History    Not on file   Tobacco Use    Smoking status: Never Smoker    Smokeless tobacco: Never Used   Substance and Sexual Activity    Alcohol use: Never    Drug use: Not Currently     Frequency: 3.0 times per week     Types: IV, Cocaine, Other-see comments     Comment: daily herion about 40$ day    Sexual activity: Not on file   Other Topics Concern    Not on file   Social History Narrative    Not on file     Social Determinants of Health     Financial Resource Strain:     Difficulty of Paying Living Expenses:    Food Insecurity:     Worried About Running Out of Food in the Last Year:     [address] in the Last Year:    Transportation Needs:     Lack of Transportation (Medical):  Lack of Transportation (Non-Medical):    Physical Activity:     Days of Exercise per Week:     Minutes of Exercise per Session:    Stress:     Feeling of Stress :    Social Connections:     Frequency of Communication with Friends and Family:     Frequency of Social Gatherings with Friends and Family:     Attends [employer] Services:     Active Member of Clubs or Organizations:     Attends Club or Organization Meetings:     Marital Status:    Intimate Partner Violence:     Fear of Current or Ex-Partner:     Emotionally Abused:     Physically Abused:     Sexually Abused:        No family history on file. Allergies:  Vicodin [hydrocodone-acetaminophen]    Home Medications:  Prior to Admission medications    Medication Sig Start Date End Date Taking? Authorizing Provider   Dextromethorphan-guaiFENesin (MUCUS RELIEF DM MAX)  MG TB12  10/10/20   Historical Provider, MD   LORATADINE-D 12HR 5-120 MG per extended release tablet  10/10/20   Historical Provider, MD   omeprazole (PRILOSEC) 40 MG delayed release capsule  10/12/20   Historical Provider, MD   dicyclomine (BENTYL) 10 MG capsule Take 1 capsule by mouth 4 times daily 10/21/20 11/20/20  Tam An MD       REVIEW OF SYSTEMS    (2-9 systems for level 4, 10 or more for level 5)      Review of Systems   Constitutional: Positive for activity change and appetite change. Negative for chills and fever. Respiratory: Negative for cough and shortness of breath. Cardiovascular: Negative for chest pain. Gastrointestinal: Positive for nausea and vomiting. Negative for abdominal pain and diarrhea. Genitourinary: Negative for dysuria, menstrual problem, pelvic pain, vaginal bleeding, vaginal discharge and vaginal pain. Musculoskeletal: Negative for back pain. Skin: Negative for rash and wound. Neurological: Negative for dizziness and headaches. Positive for sleepiness   Psychiatric/Behavioral: Positive for behavioral problems. PHYSICAL EXAM   (up to 7 for level 4, 8 or more for level 5)      INITIAL VITALS:   BP (!) 156/96   Pulse 103   Temp 98.4 °F (36.9 °C)   Resp 18   Ht 5' 8\" (1.727 m)   Wt 175 lb (79.4 kg)   LMP 07/08/2021   SpO2 95%   BMI 26.61 kg/m²     Physical Exam  Vitals reviewed. Constitutional:       General: She is not in acute distress. Appearance: She is not toxic-appearing or diaphoretic. Comments: Patient is resting company on stretcher, is alert however will intermittently fall asleep but is easily arousable to voice   HENT:      Head: Normocephalic and atraumatic. Mouth/Throat:      Mouth: Mucous membranes are dry. Pharynx: No oropharyngeal exudate or posterior oropharyngeal erythema. Eyes:      Extraocular Movements: Extraocular movements intact. Conjunctiva/sclera: Conjunctivae normal.      Pupils: Pupils are equal, round, and reactive to light. Cardiovascular:      Rate and Rhythm: Tachycardia present. Pulses: Normal pulses. Heart sounds: Normal heart sounds. Pulmonary:      Effort: Pulmonary effort is normal.      Breath sounds: Normal breath sounds. Abdominal:      General: Abdomen is flat. Palpations: Abdomen is soft. Tenderness: There is no abdominal tenderness. There is no guarding or rebound. Musculoskeletal:      Right lower leg: No edema. Left lower leg: No edema. Skin:     General: Skin is warm.    Neurological:      Comments: Patient follows commands however will initially fall asleep but is easily arousable, upon reevaluation patient was sitting on stool vomiting did not have any further dozing off incidences         DIFFERENTIAL  DIAGNOSIS     PLAN ([place] / IMAGING / EKG):  Orders Placed This Encounter   Procedures    CBC Auto Differential    Basic Metabolic Panel    Lipase    Urinalysis with Microscopic    Urine Drug Screen    PREGNANCY, URINE    TOX SCR, BLD, ED    EKG 12 Lead       MEDICATIONS ORDERED:  Orders Placed This Encounter   Medications    0.9 % sodium chloride bolus    ondansetron (ZOFRAN) injection 4 mg       DDX: Gastritis, cannabinoid induced hyperemesis, electrolyte abnormalities, dehydration, narcolepsy    DIAGNOSTIC RESULTS / EMERGENCY DEPARTMENT COURSE / MDM   :  Results for orders placed or performed during the hospital encounter of 08/05/21   CBC Auto Differential   Result Value Ref Range    WBC 19.1 (H) 4.5 - 13.5 k/uL    RBC 4.84 3.95 - 5.11 m/uL    Hemoglobin 14.2 11.9 - 15.1 g/dL    Hematocrit 44.6 36.3 - 47.1 %    MCV 92.1 82.6 - 102.9 fL    MCH 29.3 25.2 - 33.5 pg    MCHC 31.8 28.4 - 34.8 g/dL    RDW 12.2 11.8 - 14.4 %    Platelets 873 495 - 102 k/uL    MPV 10.3 8.1 - 13.5 fL    NRBC Automated 0.0 0.0 per 100 WBC    Differential Type NOT REPORTED     WBC Morphology NOT REPORTED     RBC Morphology NOT REPORTED     Platelet Estimate NOT REPORTED     Seg Neutrophils 88 (H) 34 - 64 %    Lymphocytes 7 (L) 25 - 45 %    Monocytes 4 2 - 8 %    Eosinophils % 0 (L) 1 - 4 %    Basophils 0 0 - 2 %    Immature Granulocytes 1 (H) 0 %    Segs Absolute 16.86 (H) 1.50 - 8.10 k/uL    Absolute Lymph # 1.39 1.10 - 3.70 k/uL    Absolute Mono # 0.68 0.10 - 1.40 k/uL    Absolute Eos # <0.03 0.00 - 0.44 k/uL    Basophils Absolute <0.03 0.00 - 0.20 k/uL    Absolute Immature Granulocyte 0.12 0.00 - 0.30 k/uL   Basic Metabolic Panel   Result Value Ref Range    Glucose 167 (H) 70 - 99 mg/dL    BUN 9 6 - 20 mg/dL    CREATININE 0.66 0.50 - 0.90 mg/dL    Bun/Cre Ratio NOT REPORTED 9 - 20    Calcium 9.1 8.6 - 10.4 mg/dL    Sodium 138 135 - 144 mmol/L    Potassium 4.2 3.7 - 5.3 mmol/L Chloride 106 98 - 107 mmol/L    CO2 21 20 - 31 mmol/L    Anion Gap 11 9 - 17 mmol/L    GFR Non-African American >60 >60 mL/min    GFR African American >60 >60 mL/min    GFR Comment          GFR Staging NOT REPORTED    Lipase   Result Value Ref Range    Lipase 23 13 - 60 U/L   TOX SCR, BLD, ED   Result Value Ref Range    Acetaminophen Level <5 (L) 10 - 30 ug/mL    Ethanol <10 <10 mg/dL    Ethanol percent <9.379 <4.554 %    Salicylate Lvl <1 (L) 3 - 10 mg/dL    Toxic Tricyclic Sc,Blood NEGATIVE NEGATIVE           RADIOLOGY:  None    EKG  None    All EKG's are interpreted by the Emergency Department Physician who either signs or Co-signs this chart in the absence of a cardiologist.    EMERGENCY DEPARTMENT COURSE/IMPRESSION: 43-year-old female present emergency department for nausea vomiting and falling asleep at work. Patient was seen 2 days ago for similar symptoms. Work-up at that time was unremarkable. Patient states she has this happen intermittently over the last 4 years, has not seen gastrology or any other specialist at this time although she states she does have a referral from her primary care doctor. Patient is slightly tachycardic likely secondary due to vomiting as patient did have retching episode here in the emergency department. Remainder physical exam is unremarkable abdomen is soft, nontender nondistended, no tenderness over McBurney's point or in the right upper quadrant. Patient was dozing off during the middle of the exam and when answering questions, denies any head trauma, there is no evidence of head trauma. We will plan for basic labs, if unremarkable patient will likely need CT head. Although patient denies any illicit drug substance use she does have a history of opioid abuse. We will plan for urine drug screen if negative will likely need further work-up. Patient was given Zofran and fluids.   Signed out to Dr. Nirali Paige awaiting labs and final disposition      PROCEDURES:  None    CONSULTS:  None    CRITICAL CARE:  None    FINAL IMPRESSION      1. Non-intractable vomiting with nausea, unspecified vomiting type          DISPOSITION / PLAN     DISPOSITION        PATIENT REFERRED TO:  No follow-up provider specified.     DISCHARGE MEDICATIONS:  New Prescriptions    No medications on file       Ximena Turcios DO  Emergency Medicine Resident    (Please note that portions of thisnote were completed with a voice recognition program.  Efforts were made to edit the dictations but occasionally words are mis-transcribed.)     Ximena Turcios DO  Resident  08/05/21 [id number]

## 2021-08-05 NOTE — ED PROVIDER NOTES
[facility]     Emergency Department     Faculty Attestation    I performed a history and physical examination of the patient and discussed management with the resident. I reviewed the residents note and agree with the documented findings and plan of care. Any areas of disagreement are noted on the chart. I was personally present for the key portions of any procedures. I have documented in the chart those procedures where I was not present during the key portions. I have reviewed the emergency nurses triage note. I agree with the chief complaint, past medical history, past surgical history, allergies, medications, social and family history as documented unless otherwise noted below. For Physician Assistant/ Nurse Practitioner cases/documentation I have personally evaluated this patient and have completed at least one if not all key elements of the E/M (history, physical exam, and MDM). Additional findings are as noted. I have personally seen and evaluated the patient. I find the patient's history and physical exam are consistent with the NP/PA documentation. I agree with the care provided, treatment rendered, disposition and follow-up plan. 58-year-old female presenting with nausea, vomiting. States it has been going on for several hours, but has had intermittent episodes of vomiting over the last several days. Was seen in the ER on 8/2 for the same. Work-up at that time showed no LFT or electrolyte abnormalities. Patient states Zofran usually helps, but she has not had any at home recently. Having mild diarrhea, no abdominal pain. No fever. Exam:  General: Sitting on the bed, awake, alert and in no acute distress  CV: normal rate and regular rhythm  Lungs: Breathing comfortably on room air with no tachypnea, hypoxia, or increased work of breathing  Abdomen: soft, non-tender, non-distended    Plan:  Labs including CBC, electrolytes, lipase, UA, pregnancy test, tox screen. Will hydrate, give antiemetics. Signed out to oncoming physician pending labs and reassessment.         Patsy Gregorio MD   Attending Emergency  Physician    (Please note that portions of this note were completed with a voice recognition program. Efforts were made to edit the dictations but occasionally words are mis-transcribed.)             Patsy Gregorio MD  08/05/21 0708

## 2021-08-05 NOTE — ED PROVIDER NOTES
Patient denies taking anything other than Prilosec for her symptoms. Patient does have a history of opioid abuse. Abdomen is soft, nontender nondistended. Patient did have episode of vomiting while emergency department was given Zofran and fluids by previous resident. Basic abdominal labs were obtained patient does have leukocytosis which is likely reactive from vomiting. Awaiting urinalysis, urine drug screen, ED tox was negative. On reassessment, patient continues to decline to provide urinalysis, requesting discharge home. Patient is able to eat and drink without difficulty. Will discharge patient, have patient follow-up with PCP for further management. Will prescribe Zofran for symptomatic management. Patient remained stable in the emergency department, was alert and oriented, eating and drinking, able to ambulate without difficulty, requesting discharge without completing work-up, patient understands risks of discharge without full work-up, but continues to request discharge home. OUTSTANDING TASKS / RECOMMENDATIONS:    1. Urinalysis  2. Urine drug screen  3. Disposition     FINAL IMPRESSION:     1. Non-intractable vomiting with nausea, unspecified vomiting type        DISPOSITION:         DISPOSITION:  [x]  Discharge   []  Transfer -    []  Admission -     []  Against Medical Advice   []  Eloped   FOLLOW-UP: No follow-up provider specified.    DISCHARGE MEDICATIONS: New Prescriptions    No medications on file           Annie Manjarrez MD  Emergency Medicine Resident  [id number] [facility]        Annie Manjarrez MD  Resident  08/06/21 [id number]

## 2021-08-06 LAB
EKG ATRIAL RATE: 73 BPM
EKG P AXIS: 79 DEGREES
EKG P-R INTERVAL: 176 MS
EKG Q-T INTERVAL: 388 MS
EKG QRS DURATION: 90 MS
EKG QTC CALCULATION (BAZETT): 427 MS
EKG R AXIS: 80 DEGREES
EKG T AXIS: 59 DEGREES
EKG VENTRICULAR RATE: 73 BPM

## 2021-08-06 PROCEDURE — 93010 ELECTROCARDIOGRAM REPORT: CPT | Performed by: INTERNAL MEDICINE

## 2021-08-29 NOTE — ED PROVIDER NOTES
[facility] ED  Emergency Department Encounter  EmergencyMedicine Resident      Pt Name:Aileen Bah  MRN: [id number]  [place] 2000  Date of evaluation: 8/2/21  PCP:  Vania Bernheim, MD     This patient was evaluated in the Emergency Department for symptoms described in the history of present illness. The patient was evaluated in the context of the global COVID-19 pandemic, which necessitated consideration that the patient might be at risk for infection with the SARS-CoV-2 virus that causes COVID-19. Institutional protocols and algorithms that pertain to the evaluation of patients at risk for COVID-19 are in a state of rapid change based on information released by regulatory bodies including the CDC and federal and state organizations. These policies and algorithms were followed during the patient's care in the ED.     CHIEF COMPLAINT            Chief Complaint   Patient presents with    Abdominal Pain         HISTORY OF PRESENT ILLNESS  (Location/Symptom, Timing/Onset, Context/Setting, Quality, Duration, Modifying Factors, Severity.)       Rigo Patrick is a 23 y.o. female who presents with nausea. Patient states she had about a 2-year history of intermittent nausea and occasional stomach cramping. States she has seen her primary care doctor several times for the issue, but has not seen GI as of yet. Denies any real acute worsening of symptoms, but presents mainly in order to get a second opinion. Patient denies any acute distress. States she currently has some mild nausea however denies any other symptoms. Denies any fever, chills, dysuria, flank pain, hematuria, abdominal pain, blood/mucous in stools. Has been prescribed Prilosec in the past without any relief. Uses Zofran for the intermittent nausea with some success.  Denies any recent alcohol, drug, marijuana use.     PAST MEDICAL / SURGICAL / SOCIAL / FAMILY HISTORY       has a past medical history of Drug abuse, [facility]     has no past surgical history on file.     Social History               Socioeconomic History    Marital status: Single       Spouse name: Not on file    Number of children: Not on file    Years of education: Not on file    Highest education level: Not on file   Occupational History    Not on file   Tobacco Use    Smoking status: Never Smoker    Smokeless tobacco: Never Used   Substance and Sexual Activity    Alcohol use: Never    Drug use: Not Currently       Frequency: 3.0 times per week       Types: IV, Cocaine, Other-see comments       Comment: daily herion about 40$ day    Sexual activity: Not on file   Other Topics Concern    Not on file   Social History Narrative    Not on file      Social Determinants of Health          Financial Resource Strain:     Difficulty of Paying Living Expenses:    Food Insecurity:     Worried About Running Out of Food in the Last Year:     Ran Out of Food in the Last Year:    Transportation Needs:     Lack of Transportation (Medical):  Lack of Transportation (Non-Medical):    Physical Activity:     Days of Exercise per Week:     Minutes of Exercise per Session:    Stress:     Feeling of Stress :    Social Connections:     Frequency of Communication with Friends and Family:     Frequency of Social Gatherings with Friends and Family:     Attends Yarsanism Services:     Active Member of Clubs or Organizations:     Attends Club or Organization Meetings:     Marital Status:    Intimate Partner Violence:     Fear of Current or Ex-Partner:     Emotionally Abused:     Physically Abused:     Sexually Abused:             Family History   No family history on file.        Allergies:  Patient has no known allergies.     Home Medications:  Home Medications           Prior to Admission medications    Medication Sig Start Date End Date Taking?  Authorizing Provider   Dextromethorphan-guaiFENesin (MUCUS RELIEF DM MAX)  MG TB12   10/10/20     Historical Provider, MD   LORATADINE-D 12HR 5-120 MG per extended release tablet   10/10/20     Historical Provider, MD   omeprazole (PRILOSEC) 40 MG delayed release capsule   10/12/20     Historical Provider, MD   dicyclomine (BENTYL) 10 MG capsule Take 1 capsule by mouth 4 times daily 10/21/20 11/20/20   Moses Sauceda MD            REVIEW OF SYSTEMS    (2-9 systems for level 4, 10 or more for level 5)       Review of Systems   Constitutional: Negative for chills and fever. HENT: Negative for congestion, ear pain and sinus pain. Respiratory: Negative for chest tightness and shortness of breath. Cardiovascular: Negative for chest pain. Gastrointestinal: Positive for abdominal pain and nausea. Negative for blood in stool, constipation, diarrhea and vomiting. Genitourinary: Negative for dysuria, flank pain and hematuria. Musculoskeletal: Negative for back pain. Skin: Negative for color change. Neurological: Negative for syncope.         PHYSICAL EXAM   (up to 7 for level 4, 8 or more for level 5)       INITIAL VITALS:   /79   Pulse 58   Temp 97.4 °F (36.3 °C) (Oral)   Resp 16   Ht 5' 8\" (1.727 m)   Wt 175 lb (79.4 kg)   SpO2 100%   BMI 26.61 kg/m²      Physical Exam  Constitutional:       General: She is not in acute distress. Appearance: She is not ill-appearing or toxic-appearing. HENT:      Head: Normocephalic and atraumatic. Cardiovascular:      Rate and Rhythm: Tachycardia present. Heart sounds: Normal heart sounds. Pulmonary:      Effort: Pulmonary effort is normal. No respiratory distress. Abdominal:      General: Abdomen is flat. There is no distension. There are no signs of injury. Palpations: Abdomen is soft. There is no mass. Tenderness: There is abdominal tenderness in the right upper quadrant and left upper quadrant. Comments: Mild discomfort on deep palpation of right upper quadrant left upper quadrant.    Skin:     General: Skin is warm and dry. Neurological:      General: No focal deficit present. Mental Status: She is alert.    Psychiatric:         Mood and Affect: Mood normal.         Behavior: Behavior normal.            DIFFERENTIAL  DIAGNOSIS      PLAN (Dirk Cloud / Clifford Nokomis / EKG):      Orders Placed This Encounter   Procedures    Comprehensive Metabolic Panel w/ Reflex to MG    Urinalysis Reflex to Culture    CBC Auto Differential    Pregnancy, Urine    Lipase    Insert peripheral IV         MEDICATIONS ORDERED:  Encounter Medications        Orders Placed This Encounter   Medications    0.9 % sodium chloride bolus    ondansetron (ZOFRAN) injection 4 mg    famotidine (PEPCID) injection 20 mg            DDX: Nausea vs gastroenteritis vs dehydration vs less likely hepatitis vs less likely pancreatitis vs less likely colitis     DIAGNOSTIC RESULTS / EMERGENCY DEPARTMENT COURSE / MDM   LAB RESULTS:         Results for orders placed or performed during the hospital encounter of 08/02/21   Comprehensive Metabolic Panel w/ Reflex to MG   Result Value Ref Range     Glucose 94 70 - 99 mg/dL     BUN 11 6 - 20 mg/dL     CREATININE 0.74 0.50 - 0.90 mg/dL     Bun/Cre Ratio NOT REPORTED 9 - 20     Calcium 8.9 8.6 - 10.4 mg/dL     Sodium 139 135 - 144 mmol/L     Potassium 3.8 3.7 - 5.3 mmol/L     Chloride 105 98 - 107 mmol/L     CO2 23 20 - 31 mmol/L     Anion Gap 11 9 - 17 mmol/L     Alkaline Phosphatase 80 35 - 104 U/L     ALT 39 (H) 5 - 33 U/L     AST 40 (H) <32 U/L     Total Bilirubin 0.22 (L) 0.3 - 1.2 mg/dL     Total Protein 6.8 6.4 - 8.3 g/dL     Albumin 3.8 3.5 - 5.2 g/dL     Albumin/Globulin Ratio 1.3 1.0 - 2.5     GFR Non-African American >60 >60 mL/min     GFR African American >60 >60 mL/min     GFR Comment            GFR Staging NOT REPORTED     Urinalysis Reflex to Culture     Specimen: Urine voided   Result Value Ref Range     Color, UA YELLOW YELLOW     Turbidity UA CLEAR CLEAR     Glucose, Ur NEGATIVE NEGATIVE     Bilirubin Urine NEGATIVE NEGATIVE     Ketones, Urine NEGATIVE NEGATIVE     Specific Gravity, UA 1.021 1.005 - 1.030     Urine Hgb NEGATIVE NEGATIVE     pH, UA 6.0 5.0 - 8.0     Protein, UA NEGATIVE NEGATIVE     Urobilinogen, Urine Normal Normal     Nitrite, Urine NEGATIVE NEGATIVE     Leukocyte Esterase, Urine NEGATIVE NEGATIVE     Urinalysis Comments           Microscopic exam not performed based on chemical results unless requested in original order.    CBC Auto Differential   Result Value Ref Range     WBC 10.0 4.5 - 13.5 k/uL     RBC 4.54 3.95 - 5.11 m/uL     Hemoglobin 13.4 11.9 - 15.1 g/dL     Hematocrit 41.5 36.3 - 47.1 %     MCV 91.4 82.6 - 102.9 fL     MCH 29.5 25.2 - 33.5 pg     MCHC 32.3 28.4 - 34.8 g/dL     RDW 12.4 11.8 - 14.4 %     Platelets 489 065 - 350 k/uL     MPV 10.6 8.1 - 13.5 fL     NRBC Automated 0.0 0.0 per 100 WBC     Differential Type NOT REPORTED       Seg Neutrophils 54 34 - 64 %     Lymphocytes 39 25 - 45 %     Monocytes 6 2 - 8 %     Eosinophils % 1 1 - 4 %     Basophils 0 0 - 2 %     Immature Granulocytes 0 0 %     Segs Absolute 5.24 1.50 - 8.10 k/uL     Absolute Lymph # 3.92 (H) 1.10 - 3.70 k/uL     Absolute Mono # 0.64 0.10 - 1.40 k/uL     Absolute Eos # 0.14 0.00 - 0.44 k/uL     Basophils Absolute <0.03 0.00 - 0.20 k/uL     Absolute Immature Granulocyte <0.03 0.00 - 0.30 k/uL     WBC Morphology NOT REPORTED       RBC Morphology NOT REPORTED       Platelet Estimate NOT REPORTED     Pregnancy, Urine   Result Value Ref Range     HCG(Urine) Pregnancy Test NEGATIVE NEGATIVE   Lipase   Result Value Ref Range     Lipase 44 13 - 60 U/L         IMPRESSION: 23year old female with chronic nausea and no acute laboratory abnormalities at this time.      All EKG's are interpreted by the Emergency Department Physician who either signs or Co-signs this chart in the absence of a cardiologist.     EMERGENCY DEPARTMENT COURSE:      ED Course as of Aug 02 0342   Mon Aug 02, 2021   0215 Will order CBC, CMP, UA, pregnancy. Tachycardia noted. Will order 1L NS while waiting for labs. [JS]   0310 Labs reviewed. Overall unremarkable. Will get updated set of vitals and plan for d/c.    [JS]       ED Course User Index  [JS] La Nena Grant DO            FINAL IMPRESSION       1. Nausea    2.  Left upper quadrant abdominal pain           DISPOSITION / PLAN      DISPOSITION   Discharge        PATIENT REFERRED TO:  Donato Valenzuela, [address] [facility] [address] [facility]     Schedule an appointment as soon as possible for a visit in 3 days        [facility] ED  [address] [address]  [phone]  Go to   If symptoms worsen        DISCHARGE MEDICATIONS:  Discharge Medication List as of 8/2/2021  3:23 AM             Camilo Taylor DO  Emergency Medicine Resident     (Please note that portions of thisnote were completed with a voice recognition program.  Efforts were made to edit the dictations but occasionally words are mis-transcribed.)     La Nena Grant DO  Resident  08/02/21 [address] Martin DO  Resident  08/29/21 [id number]

## 2021-08-30 ENCOUNTER — TELEPHONE (OUTPATIENT)
Dept: OBGYN | Age: 21
End: 2021-08-30

## 2021-09-07 ENCOUNTER — TELEPHONE (OUTPATIENT)
Dept: OBGYN | Age: 21
End: 2021-09-07

## 2021-09-14 DIAGNOSIS — Z34.90 PREGNANCY, UNSPECIFIED GESTATIONAL AGE: Primary | ICD-10-CM

## 2022-02-04 ENCOUNTER — TELEPHONE (OUTPATIENT)
Dept: PRIMARY CARE CLINIC | Age: 22
End: 2022-02-04

## 2022-02-04 NOTE — TELEPHONE ENCOUNTER
----- Message from Ponce Aaron sent at 2/3/2022  4:13 PM EST -----  Subject: Message to Provider    QUESTIONS  Information for Provider? Amanda Swift with Medicaid is calling get the   diagnosis code for the medication vivitrol. Please call Zane Nino at   [phone]  ---------------------------------------------------------------------------  --------------  Vanessa ORTEGA  What is the best way for the office to contact you? OK to leave message on   voicemail  Preferred Call Back Phone Number? [phone]  ---------------------------------------------------------------------------  --------------  SCRIPT ANSWERS  Relationship to Patient?  Self

## 2022-02-07 NOTE — TELEPHONE ENCOUNTER
Called and left Bethany Patino a message that Dr. Rashida Gonzalez is not treating the pt with that medication

## 2023-04-15 ENCOUNTER — HOSPITAL ENCOUNTER (EMERGENCY)
Age: 23
Discharge: HOME | End: 2023-04-15
Attending: EMERGENCY MEDICINE
Payer: COMMERCIAL

## 2023-04-15 VITALS
SYSTOLIC BLOOD PRESSURE: 122 MMHG | OXYGEN SATURATION: 97 % | RESPIRATION RATE: 14 BRPM | DIASTOLIC BLOOD PRESSURE: 77 MMHG | HEART RATE: 98 BPM | TEMPERATURE: 98.6 F

## 2023-04-15 DIAGNOSIS — L02.419 AXILLARY ABSCESS: Primary | ICD-10-CM

## 2023-04-15 PROCEDURE — 90715 TDAP VACCINE 7 YRS/> IM: CPT | Performed by: STUDENT IN AN ORGANIZED HEALTH CARE EDUCATION/TRAINING PROGRAM

## 2023-04-15 PROCEDURE — 6360000002 HC RX W HCPCS: Performed by: STUDENT IN AN ORGANIZED HEALTH CARE EDUCATION/TRAINING PROGRAM

## 2023-04-15 PROCEDURE — 99284 EMERGENCY DEPT VISIT MOD MDM: CPT

## 2023-04-15 PROCEDURE — 90471 IMMUNIZATION ADMIN: CPT | Performed by: STUDENT IN AN ORGANIZED HEALTH CARE EDUCATION/TRAINING PROGRAM

## 2023-04-15 RX ADMIN — TETANUS TOXOID, REDUCED DIPHTHERIA TOXOID AND ACELLULAR PERTUSSIS VACCINE, ADSORBED 0.5 ML: 5; 2.5; 8; 8; 2.5 SUSPENSION INTRAMUSCULAR at 21:12

## 2023-04-15 ASSESSMENT — ENCOUNTER SYMPTOMS
NAUSEA: 0
ABDOMINAL PAIN: 0
VOMITING: 0
COUGH: 0
SHORTNESS OF BREATH: 0

## 2023-05-06 ENCOUNTER — APPOINTMENT (OUTPATIENT)
Dept: ULTRASOUND IMAGING | Age: 23
End: 2023-05-06
Payer: COMMERCIAL

## 2023-05-06 ENCOUNTER — HOSPITAL ENCOUNTER (EMERGENCY)
Age: 23
Discharge: HOME | End: 2023-05-06
Attending: EMERGENCY MEDICINE
Payer: COMMERCIAL

## 2023-05-06 VITALS
BODY MASS INDEX: 30.31 KG/M2 | HEIGHT: 68 IN | SYSTOLIC BLOOD PRESSURE: 129 MMHG | OXYGEN SATURATION: 100 % | TEMPERATURE: 98.1 F | RESPIRATION RATE: 19 BRPM | HEART RATE: 89 BPM | DIASTOLIC BLOOD PRESSURE: 87 MMHG | WEIGHT: 200 LBS

## 2023-05-06 DIAGNOSIS — N93.9 VAGINAL BLEEDING: Primary | ICD-10-CM

## 2023-05-06 LAB
AMORPHOUS: ABNORMAL
BILIRUBIN URINE: NEGATIVE
CANDIDA SPECIES, DNA PROBE: NEGATIVE
COLOR: YELLOW
EPITHELIAL CELLS UA: ABNORMAL /HPF (ref 0–5)
GARDNERELLA VAGINALIS, DNA PROBE: NEGATIVE
GLUCOSE UR STRIP.AUTO-MCNC: NEGATIVE MG/DL
HCG QUANTITATIVE: <1 MIU/ML
KETONES UR STRIP.AUTO-MCNC: NEGATIVE MG/DL
LEUKOCYTE ESTERASE UR QL STRIP.AUTO: NEGATIVE
NITRITE UR QL STRIP.AUTO: NEGATIVE
PROT UR STRIP.AUTO-MCNC: 7.5 MG/DL (ref 5–8)
PROT UR STRIP.AUTO-MCNC: NEGATIVE MG/DL
RBC CLUMPS #/AREA URNS AUTO: ABNORMAL /HPF (ref 0–2)
SOURCE: NORMAL
SPECIFIC GRAVITY UA: 1.02 (ref 1–1.03)
TRICHOMONAS VAGINALIS DNA: NEGATIVE
TURBIDITY: ABNORMAL
URINE HGB: ABNORMAL
UROBILINOGEN, URINE: NORMAL
WBC UA: ABNORMAL /HPF (ref 0–5)

## 2023-05-06 PROCEDURE — 87480 CANDIDA DNA DIR PROBE: CPT

## 2023-05-06 PROCEDURE — 84702 CHORIONIC GONADOTROPIN TEST: CPT

## 2023-05-06 PROCEDURE — 87510 GARDNER VAG DNA DIR PROBE: CPT

## 2023-05-06 PROCEDURE — 76830 TRANSVAGINAL US NON-OB: CPT

## 2023-05-06 PROCEDURE — 87660 TRICHOMONAS VAGIN DIR PROBE: CPT

## 2023-05-06 PROCEDURE — 99284 EMERGENCY DEPT VISIT MOD MDM: CPT

## 2023-05-06 PROCEDURE — 87491 CHLMYD TRACH DNA AMP PROBE: CPT

## 2023-05-06 PROCEDURE — 81001 URINALYSIS AUTO W/SCOPE: CPT

## 2023-05-06 PROCEDURE — 87591 N.GONORRHOEAE DNA AMP PROB: CPT

## 2023-05-06 ASSESSMENT — ENCOUNTER SYMPTOMS
SHORTNESS OF BREATH: 0
COUGH: 0
CHOKING: 0
CHEST TIGHTNESS: 0
SORE THROAT: 0
ABDOMINAL PAIN: 0

## 2023-05-06 ASSESSMENT — PAIN - FUNCTIONAL ASSESSMENT: PAIN_FUNCTIONAL_ASSESSMENT: NONE - DENIES PAIN

## 2023-05-06 NOTE — ED TRIAGE NOTES
Pt ambulated to room 01 from triage with c/o of having vaginal bleeding from an  last month. Patient has been bleeding since then. Patient does state she has been passing clots. Pt states the bleeding is light but ongoing. Pt respirations are even and unlabored, pt is alert and oriented X 4, speaking in complete sentences, bed is in the lowest position, call light is within reach.

## 2023-05-06 NOTE — ED NOTES
Pt back from US  Pt respirations are even and unlabored, pt is alert and oriented X 4, speaking in complete sentences, bed is in the lowest position, call light is within reach.          Odalys Ordoñez RN  05/06/23 2534

## 2023-05-06 NOTE — DISCHARGE INSTRUCTIONS
Call today or tomorrow to follow up with Bryanna Carrasquillo MD  or your OB / GYN in 2-3 days for follow up of your bleeding. Use ibuprofen or Tylenol for pain. You can take over the counter Ibuprofen (advil) tablets (4 tablets every 8 hours or 3 tablets every 6 hours or 2 tablets every 4 hours). Please continue to take your oral contraceptives as directed. Return to the Emergency Department for worsening of vaginal bleeding, using more than 4 pads per hour, feeling of weakness, dizzy, nausea / vomiting, any other care or concern.

## 2023-05-06 NOTE — ED PROVIDER NOTES
[address]  ED  eMERGENCY dEPARTMENT eNCOUnter   Attending Attestation     Pt Name: Laine Claire  MRN: [id number]  [place] 2000  Date of evaluation: 23       Laine Claire is a 20 y.o. female who presents with Vaginal Bleeding      History: Presents with vaginal bleeding. Patient had a elective  at 19 weeks approximately 2 months ago. Patient was started on birth control after that. Patient missed 1 day and started bleeding after that is been bleeding since. She says she has had some intermittent cramping but no pain right now. Exam: Heart rate and rhythm are regular. Lungs are clear to auscultation bilaterally. Abdomen is soft, nontender. Patient awake alert and acting appropriately. Please see resident note for pelvic exam.    Plan for pelvic exam, labs, urinalysis, ultrasound primarily to rule out retained products of conception    I performed a history and physical examination of the patient and discussed management with the resident. I reviewed the residents note and agree with the documented findings and plan of care. Any areas of disagreement are noted on the chart. I was personally present for the key portions of any procedures. I have documented in the chart those procedures where I was not present during the key portions. I have personally reviewed all images and agree with the resident's interpretation. I have reviewed the emergency nurses triage note. I agree with the chief complaint, past medical history, past surgical history, allergies, medications, social and family history as documented unless otherwise noted below. Documentation of the HPI, Physical Exam and Medical Decision Making performed by medical students or scribes is based on my personal performance of the HPI, PE and MDM.  For Phys Assistant/ Nurse Practitioner cases/documentation I have had a face to face evaluation of this patient and have completed at least one if not all key elements of
Note Started: 5:15 PM EDT     Faculty Sign-Out Attestation  Handoff taken on the following patient from prior Attending Physician: Flavio Person    I was available and discussed any additional care issues that arose and coordinated the management plans with the resident(s) caring for the patient during my duty period. Any areas of disagreement with residents documentation of care or procedures are noted on the chart. I was personally present for the key portions of any/all procedures during my duty period. I have documented in the chart those procedures where I was not present during the key portions. 23-year-old female recent active  2 months ago presenting with vaginal bleeding. Awaiting ultrasound to rule out retained products of conception.     Chivo Andrews MD  Attending Physician        Chivo Andrews MD  [phone]
emergency department with concerns of 2 weeks of light vaginal bleeding following missing out contraceptive. The patient denies any recent sexual activity and states that they had an  approximately 2 months ago. The patient has a notable history of uterine atony following delivery. Patient's physical examination is notable for a closed cervix with bloody discharge. No lacerations were noted no signs of trauma present. Current plan is to get a transvaginal ultrasound, urinalysis performed GC, chlamydia, gonorrhea, trichomoniasis, BV, bacterial vaginosis, and a urinalysis as well as consult obstetrics once relevant imaging has been completed. Amount and/or Complexity of Data Reviewed  Labs: ordered. Decision-making details documented in ED Course. Radiology: ordered. Decision-making details documented in ED Course. ECG/medicine tests:  Decision-making details documented in ED Course. EKG  none  All EKG's are interpreted by the Emergency Department Physician who either signs or Co-signs this chart in the absence of a cardiologist.    EMERGENCY DEPARTMENT COURSE:      ED Course as of 23 1904   Sat May 06, 2023   1612 Had  2 months ago and has been on birth control for since. Missed a pills 2 weeks ago. Bleeding began the next day. Described as light. Using a pad or two each day, light amount discharge, with clumps. Notes some abdominal cramping in the suprapubic region. Denies nausea or vomiting. LMP was in November. No sex since . A1  No fever , chill, CP, SOB, urinary changes, headche, confusion, weakness   [MZ]   1716 Urine Hgb(!): LARGE [MZ]   0056 hCG Quant: <1 [MZ]   1591 Vaginitis DNA Probe:    SOURCE, [id number] . VAGINAL SWAB   Trichomonas Vaginalis DNA NEGATIVE   Gardnerella Vaginalis, DNA Probe NEGATIVE   Hcinyere Species, DNA Probe NEGATIVE  negative [MZ]   1855 US NON OB TRANSVAGINAL  Prelim result  IMPRESSION:  1. 1.1 cm complex ovarian renal cyst.  2. Normal

## 2023-05-08 LAB
C TRACH DNA SPEC QL PROBE+SIG AMP: NEGATIVE
N GONORRHOEA DNA SPEC QL PROBE+SIG AMP: NEGATIVE
SPECIMEN DESCRIPTION: NORMAL

## 2023-08-08 ENCOUNTER — HOSPITAL ENCOUNTER (EMERGENCY)
Age: 23
Discharge: HOME | End: 2023-08-08
Attending: EMERGENCY MEDICINE
Payer: COMMERCIAL

## 2023-08-08 VITALS
SYSTOLIC BLOOD PRESSURE: 155 MMHG | HEART RATE: 98 BPM | RESPIRATION RATE: 18 BRPM | TEMPERATURE: 98.5 F | OXYGEN SATURATION: 97 % | DIASTOLIC BLOOD PRESSURE: 99 MMHG

## 2023-08-08 DIAGNOSIS — R51.9 ACUTE NONINTRACTABLE HEADACHE, UNSPECIFIED HEADACHE TYPE: Primary | ICD-10-CM

## 2023-08-08 PROCEDURE — 6360000002 HC RX W HCPCS: Performed by: EMERGENCY MEDICINE

## 2023-08-08 PROCEDURE — 96372 THER/PROPH/DIAG INJ SC/IM: CPT | Performed by: EMERGENCY MEDICINE

## 2023-08-08 PROCEDURE — 99284 EMERGENCY DEPT VISIT MOD MDM: CPT | Performed by: EMERGENCY MEDICINE

## 2023-08-08 RX ORDER — PROCHLORPERAZINE EDISYLATE 5 MG/ML
10 INJECTION INTRAMUSCULAR; INTRAVENOUS ONCE
Status: COMPLETED | OUTPATIENT
Start: 2023-08-08 | End: 2023-08-08

## 2023-08-08 RX ORDER — KETOROLAC TROMETHAMINE 30 MG/ML
30 INJECTION, SOLUTION INTRAMUSCULAR; INTRAVENOUS ONCE
Status: COMPLETED | OUTPATIENT
Start: 2023-08-08 | End: 2023-08-08

## 2023-08-08 RX ORDER — DIPHENHYDRAMINE HYDROCHLORIDE 50 MG/ML
25 INJECTION INTRAMUSCULAR; INTRAVENOUS ONCE
Status: COMPLETED | OUTPATIENT
Start: 2023-08-08 | End: 2023-08-08

## 2023-08-08 RX ADMIN — DIPHENHYDRAMINE HYDROCHLORIDE 25 MG: 50 INJECTION INTRAMUSCULAR; INTRAVENOUS at 04:44

## 2023-08-08 RX ADMIN — KETOROLAC TROMETHAMINE 30 MG: 30 INJECTION, SOLUTION INTRAMUSCULAR; INTRAVENOUS at 04:44

## 2023-08-08 RX ADMIN — PROCHLORPERAZINE EDISYLATE 10 MG: 5 INJECTION INTRAMUSCULAR; INTRAVENOUS at 04:45

## 2023-08-08 ASSESSMENT — ENCOUNTER SYMPTOMS
ABDOMINAL PAIN: 0
COUGH: 0
SHORTNESS OF BREATH: 0
NAUSEA: 1
VOMITING: 0

## 2023-08-08 ASSESSMENT — PAIN DESCRIPTION - LOCATION: LOCATION: HEAD

## 2023-08-08 ASSESSMENT — PAIN SCALES - GENERAL: PAINLEVEL_OUTOF10: 7

## 2023-08-08 ASSESSMENT — PAIN - FUNCTIONAL ASSESSMENT: PAIN_FUNCTIONAL_ASSESSMENT: 0-10

## 2023-08-08 NOTE — ED NOTES
Pt. Arrives to ED via private auto for c/o headache and nausea. Pt states she has had a headache for about 2 weeks and has tried several OTC medications including tylenol, IBU, and Excedrin migraine with little relief. Pt states that the nausea is an ongoing issue but has gotten worse with the headache.        Audra Patel RN  08/08/23 1394

## 2023-08-08 NOTE — DISCHARGE INSTRUCTIONS
You are seen in the ER today for your headache. We gave you a migraine cocktail which seemed to improve your symptoms. At this time, you are stable for discharge. As we discussed, if your symptoms recur, worsen, or change in any way, please return to the ER for additional imaging of your head. Otherwise, please follow-up your primary care provider for further headache management as an outpatient. Please return to the ER if you develop fevers, neck stiffness, weakness in 1 arm or 1 leg, or any other concerning symptoms. Otherwise, please with your primary care provider. PLEASE RETURN TO THE EMERGENCY DEPARTMENT IMMEDIATELY if you develop any concerning symptoms such as: chest pain, shortness of breath, feeling like your heart is racing, high fever not relieved by acetaminophen (Tylenol) and/or ibuprofen (Motrin / Advil), chills, persistent nausea and/or vomiting, loss of consciousness, numbness, weakness or tingling in the arms or legs or change in color of the extremities, changes in mental status, persistent or severe headache, blurry vision, loss of bladder / bowel control, unable to follow up with your physician, or other any other care or concern.

## 2023-08-08 NOTE — ED TRIAGE NOTES
Pt presents to the ED with c/o headache that has been persisting for the past two weeks. Pt states she has been taking Tylenol and Excedrin without any relief. Pt also reports some nausea and vomiting over the past day. Pt is in no distress, RR even and unlabored. Call light within reach.

## 2023-08-11 NOTE — ED PROVIDER NOTES
[facility] ED  Emergency Department Encounter  Emergency Medicine Resident     Pt Name:Aileen Hill  MRN: [id number]  [address] [facility] 2000  Date of evaluation: 8/8/23  PCP:  January Fitzgerald MD  Note Started: 4:29 AM EDT      CHIEF COMPLAINT       Chief Complaint   Patient presents with    Headache    Nausea       HISTORY OF PRESENT ILLNESS  (Location/Symptom, Timing/Onset, Context/Setting, Quality, Duration, Modifying Factors, Severity.)      Lashonda Oakley is a 20 y.o. female who presents with progressively worsening headache over the last 2 weeks. Patient denies any double vision blurry vision, weakness, fevers or chills. Denies any neck stiffness. Not on any blood thinners. Denies falling and striking her head. Patient states she does have a family history of migraines however she does not have a history. States she has been having headaches over the last 2 weeks. Patient dates over the last few days she has developed nausea, no vomiting. PAST MEDICAL / SURGICAL / SOCIAL / FAMILY HISTORY      has a past medical history of Drug abuse, daily use ([address] has no past surgical history on file.       Social History     Socioeconomic History    Marital status: Single     Spouse name: Not on file    Number of children: Not on file    Years of education: Not on file    Highest education level: Not on file   Occupational History    Not on file   Tobacco Use    Smoking status: Never    Smokeless tobacco: Never   Substance and Sexual Activity    Alcohol use: Never    Drug use: Not Currently     Frequency: 3.0 times per week     Types: IV, Cocaine, Other-see comments     Comment: daily herion about 40$ day    Sexual activity: Not on file   Other Topics Concern    Not on file   Social History Narrative    Not on file     Social Determinants of Health     Financial Resource Strain: Not on file   Food Insecurity: Not on file   Transportation Needs: Not on file   Physical Activity: Not on
Normal strenght and sensation throughout. Medical Decision Making  Headache with no history of migraines before  I discussed with patient that currently no clinical s/s of meningitis and I had a lower suspicion of SAH, however given that this is her first headache like this could not rule out  She had just received medications and using shared decision making we discussed imaging. Decision was made to re-evaluate after medications and if symptoms improved then patient would like to be discharged and if not then would proceed with imaging. Problems Addressed:  Acute nonintractable headache, unspecified headache type: acute illness or injury    Risk  Prescription drug management.          Critical Care: None     Tana Sam MD  Attending Emergency Physician         Tana Sam MD  08/11/23 5618